# Patient Record
(demographics unavailable — no encounter records)

---

## 2025-05-27 NOTE — HISTORY OF PRESENT ILLNESS
[FreeTextEntry1] : 24-year-old female presenting for work-related injury to her right wrist.  Injury occurred 4 - 25 - 25.  She was lifting boxes felt a strain in her right wrist.  She had pain since the event she notices pain in her thumb base related to wrist motion.  She has been out of work for 1 month since the event.  She still having persistent pain that has not improved.  She works at  [employer].

## 2025-05-27 NOTE — ASSESSMENT
[FreeTextEntry1] : -We reviewed de Quervain's tenosynovitis and different treatment options.  We discussed how it is related to inflammation of the first dorsal compartment.  Given the acuity and treatment thus far I recommended bracing with a thumb spica brace to wear when at rest or at night for a 3 to 4-week duration, anti-inflammatories.  We also discussed the role of corticosteroid injection and associated risks benefits, and alternatives. - She elects proceed with hand therapy today We discussed meloxicam and the patient will trial 2-week course. Patient advised not to take any NSAIDs at this time.  We discussed potential GI effects as well as kidney effects. Pain denies history of GI or kidney issues and will discontinue the medication immediately if she does notice stomach problems.  Advised to take with food. - She is given a thumb spica brace - Follow-up in 4 weeks  She has a list of duties with her today.  She is not able to lift more than 10 pounds at this time due to risk of reevaluation.  At times remains out of work with 100% disability.  She will follow-up in 4 weeks.

## 2025-05-27 NOTE — PHYSICAL EXAM
[de-identified] : Right hand and wrist  -Hand and digits warm well-perfused, no abrasion, redness or drainage - mild swelling over 1st dorsal compartment -No tenderness over thumb CMC, negative CMC grind, no tenderness over thumb A1 gonzalo -Positive Finkelstein's test, pain over the first dorsal compartment. -Patient able to make full composite fist.  Denies numbness or tingling in the radial, median, ulnar nerve distributions [de-identified] : Three-view x-ray of the right wrist were taken today, including AP lateral oblique these were personally reviewed  these demonstrate: No fractures, no bony lesions evident.  No significant degenerative change

## 2025-06-24 NOTE — ASSESSMENT
[FreeTextEntry1] : -We reviewed de Quervain's tenosynovitis and different treatment options.  We discussed how it is related to inflammation of the first dorsal compartment - pain has resolved at this time point. we discussed there is a risk of recurrence - she did not attend therapy, pain improved on her own - otc medications PRN  - follow up as needed  - may return to work without restriction

## 2025-06-24 NOTE — PHYSICAL EXAM
[de-identified] : Right hand and wrist  -Hand and digits warm well-perfused, no abrasion, redness or drainage -  no swelling over 1st dorsal compartment -No tenderness over thumb CMC, negative CMC grind, no tenderness over thumb A1 pulley -negative Finkelstein's test, pain over the first dorsal compartment. -Patient able to make full composite fist.  Denies numbness or tingling in the radial, median, ulnar nerve distributions

## 2025-06-24 NOTE — HISTORY OF PRESENT ILLNESS
[FreeTextEntry1] : 24 year old femal here for follow up. she states she feels significantly improved since prior visit. She has no pain in her rist at this time point. she has remained out of work. she tried bracing, as well as meloxicam which she has stopped taking